# Patient Record
Sex: MALE | Race: WHITE | NOT HISPANIC OR LATINO | Employment: FULL TIME | URBAN - METROPOLITAN AREA
[De-identification: names, ages, dates, MRNs, and addresses within clinical notes are randomized per-mention and may not be internally consistent; named-entity substitution may affect disease eponyms.]

---

## 2018-03-30 ENCOUNTER — OFFICE VISIT (OUTPATIENT)
Dept: URGENT CARE | Facility: CLINIC | Age: 53
End: 2018-03-30

## 2018-03-30 VITALS
RESPIRATION RATE: 19 BRPM | SYSTOLIC BLOOD PRESSURE: 117 MMHG | DIASTOLIC BLOOD PRESSURE: 76 MMHG | WEIGHT: 198 LBS | BODY MASS INDEX: 26.24 KG/M2 | HEIGHT: 73 IN | HEART RATE: 77 BPM | TEMPERATURE: 98 F | OXYGEN SATURATION: 96 %

## 2018-03-30 DIAGNOSIS — R05.9 COUGH: ICD-10-CM

## 2018-03-30 DIAGNOSIS — R06.2 WHEEZING: Primary | ICD-10-CM

## 2018-03-30 PROCEDURE — 99203 OFFICE O/P NEW LOW 30 MIN: CPT | Mod: 25,S$GLB,, | Performed by: NURSE PRACTITIONER

## 2018-03-30 PROCEDURE — 94640 AIRWAY INHALATION TREATMENT: CPT | Mod: S$GLB,,, | Performed by: FAMILY MEDICINE

## 2018-03-30 PROCEDURE — 96372 THER/PROPH/DIAG INJ SC/IM: CPT | Mod: 59,S$GLB,, | Performed by: FAMILY MEDICINE

## 2018-03-30 RX ORDER — PREDNISONE 20 MG/1
TABLET ORAL
Qty: 14 TABLET | Refills: 0 | Status: SHIPPED | OUTPATIENT
Start: 2018-03-30

## 2018-03-30 RX ORDER — ALBUTEROL SULFATE 90 UG/1
2 AEROSOL, METERED RESPIRATORY (INHALATION) EVERY 6 HOURS PRN
Qty: 18 G | Refills: 1 | Status: SHIPPED | OUTPATIENT
Start: 2018-03-30

## 2018-03-30 RX ORDER — PREDNISONE 20 MG/1
60 TABLET ORAL ONCE
Status: COMPLETED | OUTPATIENT
Start: 2018-03-30 | End: 2018-03-30

## 2018-03-30 RX ORDER — IPRATROPIUM BROMIDE 0.5 MG/2.5ML
0.5 SOLUTION RESPIRATORY (INHALATION)
Status: COMPLETED | OUTPATIENT
Start: 2018-03-30 | End: 2018-03-30

## 2018-03-30 RX ORDER — ALBUTEROL SULFATE 0.83 MG/ML
2.5 SOLUTION RESPIRATORY (INHALATION)
Status: COMPLETED | OUTPATIENT
Start: 2018-03-30 | End: 2018-03-30

## 2018-03-30 RX ORDER — ALBUTEROL SULFATE 0.83 MG/ML
2.5 SOLUTION RESPIRATORY (INHALATION) DAILY
COMMUNITY

## 2018-03-30 RX ADMIN — ALBUTEROL SULFATE 2.5 MG: 0.83 SOLUTION RESPIRATORY (INHALATION) at 01:03

## 2018-03-30 RX ADMIN — IPRATROPIUM BROMIDE 0.5 MG: 0.5 SOLUTION RESPIRATORY (INHALATION) at 01:03

## 2018-03-30 RX ADMIN — PREDNISONE 60 MG: 20 TABLET ORAL at 01:03

## 2018-03-30 NOTE — PROGRESS NOTES
"Subjective:       Patient ID: Kevin Lane is a 52 y.o. male.    Vitals:  height is 6' 1" (1.854 m) and weight is 89.8 kg (198 lb). His oral temperature is 97.9 °F (36.6 °C). His blood pressure is 117/76 and his pulse is 77. His respiration is 19 and oxygen saturation is 96%.     Chief Complaint: Cough and Shortness of Breath    Pt states he has had a cough since christmas and wheezing with shortness of breath for a month. Pt says he was treated with antibiotics in february for a sinus infection. He was seen a short time after that and told he had bronchitis and was given an inhaler. He said his cough has improved- he was previously coughing up thick green mucous and now his cough is dry. Pt said he feels like he needs the albuterol hourly and that his wheezing hasnt improved but is getting worse. Pt denies any fever or chest pain. Pt is a  and says his wheezing is worst when he is in the kitchen. Pt has worked the line In the kitchen for 20 years. Pt also smoked socially until 2011.    Per Dr. Salmeron. I have evaluated pt with the Np. He has all his sxs for 3 months. He has been to [facility] and given an inhaler and steroids once and antibiotics another time. He feels like he needs his inhaler every hour but has restrained himself to using it 4 times a day. He feels like it is the new job with fumes from the  area that causes the problem  Pt with low 02 sats and markedly decrease BS CXR shows hyperinflated lungs but no infiltrate. Pt doing better post neb treatment. He will be given a longer steroid treatment with taper and emphasize followup      Cough   This is a new problem. Episode onset: cough been presistent since after Christmas. The problem has been rapidly worsening. The problem occurs constantly. The cough is productive of sputum. Associated symptoms include nasal congestion, postnasal drip, shortness of breath and wheezing. Pertinent negatives include no chest pain, chills, ear pain, eye " redness, fever, headaches, myalgias or sore throat. The symptoms are aggravated by pollens and fumes. 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 He has tried OTC cough suppressant (mucinex) for the symptoms. The treatment provided mild relief.   Shortness of Breath   This is a new problem. Episode onset: beginning of Jan.  The problem occurs constantly. The problem has been rapidly worsening. Duration: constantly. Associated symptoms include sputum production and wheezing. Pertinent negatives include no abdominal pain, chest pain, ear pain, fever, headaches, leg swelling or sore throat. The symptoms are aggravated by fumes. The patient has no known risk factors for DVT/PE. He has tried steroid inhalers for the symptoms. The treatment provided mild relief.     Review of Systems   Constitution: Negative for chills, fever and malaise/fatigue.   HENT: Positive for congestion and postnasal drip. Negative for ear pain, hoarse voice and sore throat.    Eyes: Negative for discharge and redness.   Cardiovascular: Negative for chest pain, dyspnea on exertion and leg swelling.   Respiratory: Positive for cough, shortness of breath, sputum production and wheezing.    Musculoskeletal: Positive for back pain. Negative for myalgias.   Gastrointestinal: Negative for abdominal pain and nausea.   Neurological: Negative for headaches.       Objective:      Physical Exam   Constitutional: He is oriented to person, place, and time. He appears well-developed and well-nourished. He is cooperative.  Non-toxic appearance. He does not appear ill. No distress.   HENT:   Head: Normocephalic and atraumatic.   Right Ear: Hearing, tympanic membrane, external ear and ear canal normal.   Left Ear: Hearing, tympanic membrane, external ear and ear canal normal.   Nose: Nose normal. No mucosal edema, rhinorrhea or nasal deformity. No epistaxis. Right sinus exhibits no maxillary sinus  tenderness and no frontal sinus tenderness. Left sinus exhibits no maxillary sinus tenderness and no frontal sinus tenderness.   Mouth/Throat: Uvula is midline, oropharynx is clear and moist and mucous membranes are normal. No trismus in the jaw. Normal dentition. No uvula swelling. No posterior oropharyngeal erythema.   Eyes: Conjunctivae and lids are normal. No scleral icterus.   Sclera clear bilat   Neck: Trachea normal, full passive range of motion without pain and phonation normal. Neck supple.   Cardiovascular: Normal rate, regular rhythm, normal heart sounds, intact distal pulses and normal pulses.    Pulmonary/Chest: Effort normal. No respiratory distress. He has decreased breath sounds in the right upper field, the right middle field, the right lower field, the left upper field, the left middle field and the left lower field. He has no wheezes.   Decreased breath sounds in all lung fields. After breathing treatment- 02 sat went up to 96, pt verbalized feeling less SOB and air movement has improved in all fields. No wheezing at this time.    Abdominal: Soft. Normal appearance and bowel sounds are normal. He exhibits no distension. There is no tenderness.   Musculoskeletal: Normal range of motion. He exhibits no edema or deformity.   Neurological: He is alert and oriented to person, place, and time. He exhibits normal muscle tone. Coordination normal.   Skin: Skin is warm, dry and intact. He is not diaphoretic. No pallor.   Psychiatric: He has a normal mood and affect. His speech is normal and behavior is normal. Judgment and thought content normal. Cognition and memory are normal.   Nursing note and vitals reviewed.        XRAY- no pneumonia, infiltrate or PE  Assessment:       1. Wheezing    2. Cough        Plan:         Wheezing  -     albuterol nebulizer solution 2.5 mg; Take 3 mLs (2.5 mg total) by nebulization one time.  -     ipratropium 0.02 % nebulizer solution 0.5 mg; Take 2.5 mLs (0.5 mg total) by  nebulization one time.  -     X-Ray Chest PA And Lateral; Future; Expected date: 03/30/2018  -     predniSONE tablet 60 mg; Take 3 tablets (60 mg total) by mouth once.  -     albuterol 90 mcg/actuation inhaler; Inhale 2 puffs into the lungs every 6 (six) hours as needed for Wheezing. Rescue  Dispense: 18 g; Refill: 1  -     predniSONE (DELTASONE) 20 MG tablet; 40mg tablet po once a day for 6 days, 20mg tablet po once a day for 5 days, 10mg tablet po once a day, every other day for 5 days  Dispense: 14 tablet; Refill: 0    Cough  -     Cancel: POCT Influenza A/B    Pt told to follow up with PCP Monday or Tuesday. Pt told to go to ER if wheezingh is not relieved by alb or gets severe or he experiences dyspnea. Pt asked a lot of questions regarding if this was a chronic condition like asthma or COPD. Pt told to follow up with PCP so they can monitor his progress on treatments and that will give him more answers

## 2018-03-30 NOTE — LETTER
March 30, 2018      [facility]  [address] [facility] [address]  Phone: [phone]  Fax: [phone]       Patient: Kevin Lane   YOB: 1965  Date of Visit: 03/30/2018    To Whom It May Concern:    Davina Lane  was at [facility] on 03/30/2018. He may return to work/school on 4/4/2018 with no restrictions. If you have any questions or concerns, or if I can be of further assistance, please do not hesitate to contact me.    Sincerely,          Benigno Ramos NP

## 2018-03-30 NOTE — PATIENT INSTRUCTIONS
Diagnosing COPD  Your healthcare provider will use your past health history, a physical exam, and certain tests to diagnose COPD.  Health history  Your healthcare provider learns about your health history by asking questions. Topics include:  · History of present illness. Tell your healthcare provider about your symptoms. Also tell him or her how long you have had them.  · Past medical and surgical history. Share other health problems and surgery you have had.  · Family history. Report serious health problems in close family members. This is especially true of any lung problems.  · Social and environmental history. The most important factor in COPD is whether you smoke or have smoked in the past. You should also tell your provider if you have been around secondhand smoke, harmful chemicals, or air pollution.  · Functional assessment. Report whether breathing gets in the way of your daily activities.  Physical exam    Your provider will examine you. He or she will check your heart and lungs with a stethoscope. The focus will be on your airways, including your nose and throat.   Diagnostic tests  · Pulmonary function tests measure the flow of air into and out of your lungs. They also check how much air your lungs can hold. The most common pulmonary function test is spirometry. This measures how fast and how much air you can blow out (exhale). This test is important to help diagnose COPD.  · Pulse oximetry shows how much oxygen is in your blood (oxygen saturation). This may be done at rest. It may also be done during and after exercise.  · Arterial blood gas tests measure levels of oxygen and carbon dioxide in your blood.  · Chest X-rays show the size and shape of your lungs. They can also show certain problems in the lungs.  · CT scans show detailed images of the lungs.  Date Last Reviewed: 10/1/2016  © 4689-3343 [employer]. [address] [place] [address], [place] [address]. All rights reserved. This  information is not intended as a substitute for professional medical care. Always follow your healthcare professional's instructions.        Understanding Asthma    Asthma causes swelling and narrowing of the airways in your lungs. Medical experts are not exactly sure what causes asthma. It is believed to be caused by a mix of inherited and environmental factors.  Healthy lungs  Inside the lungs there are branching airways made of stretchy tissue. Each airway is wrapped with bands of muscle. The airways become more narrow as they go deeper into the lungs. The smallest airways end in clusters of tiny balloon-like air sacs (alveoli). These clusters are surrounded by blood vessels. When you breathe in (inhale), air enters the lungs. It travels down through the airways until it reaches the air sacs. When you breathe out (exhale), air travels up through the airways and out of the lungs. The airways produce mucus that traps particles you breathe in. Normally, the mucus is then swept out of the lungs by tiny hairs (cilia) that line the airways. The mucus is swallowed or coughed up.  What the lungs do  The air you inhale contains oxygen. When oxygen reaches the air sacs, it passes into the blood vessels surrounding the sacs. Your blood then delivers oxygen to all of your cells. As you exhale, carbon dioxide is removed in a similar way from the blood in the air sacs, and from the body.  When you have asthma  People with asthma have very sensitive airways. This means the airways react to certain things called triggers (such as pollen, dust, or smoke) and become swollen and narrowed. Inflammation makes the airways swollen and narrowed. This is a long-lasting (chronic) problem. The airways may not always be narrowed enough to notice breathing problems.  Symptoms of chronic inflammation:   · Coughing  · Chest tightness  · Shortness of breath  · Wheezing (a whistling noise, especially when breathing out)  · Low energy or feeling  tired  In some people, over time chronic mild inflammation can lead to lasting (permanent) scarring of airways and loss of lung function.  Moderate flare-ups  When sensitive airways are irritated by a trigger, the muscles around the airways tighten. The lining of the airways swells. Thick, sticky mucus increases and partly clogs the airways. All of this makes you work harder to keep breathing.  Symptoms of moderate flare-ups:  · Coughing, especially at night  · Getting tired or out of breath easily  · Wheezing  · Chest tightness  · Faster breathing when at rest  Severe flare-ups  Severe flare-ups are life-threatening. In a severe flare-up, the muscle tightening, swelling, and mucus production are even worse. Its very hard to breathe. Your body can't get enough oxygen and can't remove carbon dioxide. Waste gas is trapped in the alveoli, and gas exchange cant occur. The body is not getting enough oxygen. Without oxygen, body tissues, especially brain tissue, begin to get damaged. If this goes on for long, it can lead to severe brain damage or death.  Call 911 (or have someone call for you) if you have any of these symptoms and they are not relieved right away by taking your quick-relief medicine as prescribed:  · Severe trouble breathing  · Too short of breath to talk or walk  · Lips or fingers turning blue  · Feeling lightheaded or dizzy, as though you are about to pass out  · Peak flow less than 50% of your personal best, if you use peak flow monitoring  Asthma is a long-term condition. So its important to work with your healthcare provider to manage it. If you smoke, get help to quit. Know your triggers and figure out how to avoid them. Its also very important to take your medicines as directed. That means taking them even when you feel good.  Date Last Reviewed: 12/1/2016  © 5006-7714 [employer]. [address] [place] [address], [place] [address]. All rights reserved. This information is not intended as  a substitute for professional medical care. Always follow your healthcare professional's instructions.    Very important to follow up with your PCP    40mg of prednisone for 6 days once a day    20mg of prednisone for 5 days once a day    10mg of prednisone once a day, every other day, for 5 days      IF YOU EVER FEELS INCREASING SHORT OF BREATH, PLEASE GO TO THE ER

## 2021-08-10 ENCOUNTER — TELEPHONE (OUTPATIENT)
Dept: DERMATOLOGY | Facility: CLINIC | Age: 56
End: 2021-08-10

## 2021-09-14 ENCOUNTER — PROCEDURE VISIT (OUTPATIENT)
Dept: DERMATOLOGY | Facility: CLINIC | Age: 56
End: 2021-09-14
Payer: MEDICAID

## 2021-09-14 VITALS
HEART RATE: 70 BPM | BODY MASS INDEX: 25.84 KG/M2 | DIASTOLIC BLOOD PRESSURE: 79 MMHG | SYSTOLIC BLOOD PRESSURE: 136 MMHG | HEIGHT: 73 IN | WEIGHT: 195 LBS

## 2021-09-14 DIAGNOSIS — C44.319 BASAL CELL CARCINOMA OF LEFT CHEEK: Primary | ICD-10-CM

## 2021-09-14 PROCEDURE — 17311 MOHS 1 STAGE H/N/HF/G: CPT | Mod: PBBFAC | Performed by: DERMATOLOGY

## 2021-09-14 PROCEDURE — 13131 CMPLX RPR F/C/C/M/N/AX/G/H/F: CPT | Mod: S$PBB,51,, | Performed by: DERMATOLOGY

## 2021-09-14 PROCEDURE — 99499 UNLISTED E&M SERVICE: CPT | Mod: S$PBB,,, | Performed by: DERMATOLOGY

## 2021-09-14 PROCEDURE — 13131 CMPLX RPR F/C/C/M/N/AX/G/H/F: CPT | Mod: PBBFAC | Performed by: DERMATOLOGY

## 2021-09-14 PROCEDURE — 13131 PR RECMPL WND HEAD,FAC,HAND 1.1-2.5 CM: ICD-10-PCS | Mod: S$PBB,51,, | Performed by: DERMATOLOGY

## 2021-09-14 PROCEDURE — 17311: ICD-10-PCS | Mod: S$PBB,,, | Performed by: DERMATOLOGY

## 2021-09-14 PROCEDURE — 99499 NO LOS: ICD-10-PCS | Mod: S$PBB,,, | Performed by: DERMATOLOGY

## 2021-09-14 PROCEDURE — 17311 MOHS 1 STAGE H/N/HF/G: CPT | Mod: S$PBB,,, | Performed by: DERMATOLOGY

## 2021-09-20 ENCOUNTER — OFFICE VISIT (OUTPATIENT)
Dept: DERMATOLOGY | Facility: CLINIC | Age: 56
End: 2021-09-20
Payer: MEDICAID

## 2021-09-20 DIAGNOSIS — Z09 POSTOP CHECK: Primary | ICD-10-CM

## 2021-09-20 PROCEDURE — 99211 OFF/OP EST MAY X REQ PHY/QHP: CPT | Mod: PBBFAC | Performed by: DERMATOLOGY

## 2021-09-20 PROCEDURE — 99999 PR PBB SHADOW E&M-EST. PATIENT-LVL I: CPT | Mod: PBBFAC,,, | Performed by: DERMATOLOGY

## 2021-09-20 PROCEDURE — 99999 PR PBB SHADOW E&M-EST. PATIENT-LVL I: ICD-10-PCS | Mod: PBBFAC,,, | Performed by: DERMATOLOGY

## 2021-09-20 PROCEDURE — 99024 POSTOP FOLLOW-UP VISIT: CPT | Mod: ,,, | Performed by: DERMATOLOGY

## 2021-09-20 PROCEDURE — 99024 PR POST-OP FOLLOW-UP VISIT: ICD-10-PCS | Mod: ,,, | Performed by: DERMATOLOGY
